# Patient Record
Sex: FEMALE | NOT HISPANIC OR LATINO | URBAN - METROPOLITAN AREA
[De-identification: names, ages, dates, MRNs, and addresses within clinical notes are randomized per-mention and may not be internally consistent; named-entity substitution may affect disease eponyms.]

---

## 2020-04-13 ENCOUNTER — EMERGENCY (EMERGENCY)
Facility: HOSPITAL | Age: 75
LOS: 1 days | Discharge: HOME | End: 2020-04-13
Attending: EMERGENCY MEDICINE
Payer: MEDICARE

## 2020-04-13 VITALS
SYSTOLIC BLOOD PRESSURE: 180 MMHG | RESPIRATION RATE: 20 BRPM | HEART RATE: 106 BPM | TEMPERATURE: 99 F | OXYGEN SATURATION: 88 % | DIASTOLIC BLOOD PRESSURE: 102 MMHG | HEIGHT: 64 IN | WEIGHT: 141.98 LBS

## 2020-04-13 VITALS
OXYGEN SATURATION: 96 % | RESPIRATION RATE: 19 BRPM | TEMPERATURE: 101 F | DIASTOLIC BLOOD PRESSURE: 87 MMHG | HEART RATE: 103 BPM | SYSTOLIC BLOOD PRESSURE: 143 MMHG

## 2020-04-13 LAB
ALBUMIN SERPL ELPH-MCNC: 2.4 G/DL — LOW (ref 3.5–5)
ALP SERPL-CCNC: 78 U/L — SIGNIFICANT CHANGE UP (ref 40–120)
ALT FLD-CCNC: 24 U/L DA — SIGNIFICANT CHANGE UP (ref 10–60)
ANION GAP SERPL CALC-SCNC: 5 MMOL/L — SIGNIFICANT CHANGE UP (ref 5–17)
AST SERPL-CCNC: 32 U/L — SIGNIFICANT CHANGE UP (ref 10–40)
BASOPHILS # BLD AUTO: 0.01 K/UL — SIGNIFICANT CHANGE UP (ref 0–0.2)
BASOPHILS NFR BLD AUTO: 0.2 % — SIGNIFICANT CHANGE UP (ref 0–2)
BILIRUB SERPL-MCNC: 0.5 MG/DL — SIGNIFICANT CHANGE UP (ref 0.2–1.2)
BUN SERPL-MCNC: 9 MG/DL — SIGNIFICANT CHANGE UP (ref 7–18)
CALCIUM SERPL-MCNC: 8.8 MG/DL — SIGNIFICANT CHANGE UP (ref 8.4–10.5)
CHLORIDE SERPL-SCNC: 99 MMOL/L — SIGNIFICANT CHANGE UP (ref 96–108)
CO2 SERPL-SCNC: 30 MMOL/L — SIGNIFICANT CHANGE UP (ref 22–31)
CREAT SERPL-MCNC: 0.72 MG/DL — SIGNIFICANT CHANGE UP (ref 0.5–1.3)
D DIMER BLD IA.RAPID-MCNC: 1271 NG/ML DDU — HIGH
EOSINOPHIL # BLD AUTO: 0.01 K/UL — SIGNIFICANT CHANGE UP (ref 0–0.5)
EOSINOPHIL NFR BLD AUTO: 0.2 % — SIGNIFICANT CHANGE UP (ref 0–6)
FIBRINOGEN PPP-MCNC: 950 MG/DL — HIGH (ref 350–510)
GLUCOSE SERPL-MCNC: 273 MG/DL — HIGH (ref 70–99)
HCT VFR BLD CALC: 39.3 % — SIGNIFICANT CHANGE UP (ref 34.5–45)
HGB BLD-MCNC: 12.8 G/DL — SIGNIFICANT CHANGE UP (ref 11.5–15.5)
IMM GRANULOCYTES NFR BLD AUTO: 0.8 % — SIGNIFICANT CHANGE UP (ref 0–1.5)
LACTATE SERPL-SCNC: 1.6 MMOL/L — SIGNIFICANT CHANGE UP (ref 0.7–2)
LDH SERPL L TO P-CCNC: 439 U/L — HIGH (ref 120–225)
LYMPHOCYTES # BLD AUTO: 1.18 K/UL — SIGNIFICANT CHANGE UP (ref 1–3.3)
LYMPHOCYTES # BLD AUTO: 23.3 % — SIGNIFICANT CHANGE UP (ref 13–44)
MCHC RBC-ENTMCNC: 28.8 PG — SIGNIFICANT CHANGE UP (ref 27–34)
MCHC RBC-ENTMCNC: 32.6 GM/DL — SIGNIFICANT CHANGE UP (ref 32–36)
MCV RBC AUTO: 88.3 FL — SIGNIFICANT CHANGE UP (ref 80–100)
MONOCYTES # BLD AUTO: 0.2 K/UL — SIGNIFICANT CHANGE UP (ref 0–0.9)
MONOCYTES NFR BLD AUTO: 4 % — SIGNIFICANT CHANGE UP (ref 2–14)
NEUTROPHILS # BLD AUTO: 3.62 K/UL — SIGNIFICANT CHANGE UP (ref 1.8–7.4)
NEUTROPHILS NFR BLD AUTO: 71.5 % — SIGNIFICANT CHANGE UP (ref 43–77)
NRBC # BLD: 0 /100 WBCS — SIGNIFICANT CHANGE UP (ref 0–0)
PLATELET # BLD AUTO: 215 K/UL — SIGNIFICANT CHANGE UP (ref 150–400)
POTASSIUM SERPL-MCNC: 4.4 MMOL/L — SIGNIFICANT CHANGE UP (ref 3.5–5.3)
POTASSIUM SERPL-SCNC: 4.4 MMOL/L — SIGNIFICANT CHANGE UP (ref 3.5–5.3)
PROT SERPL-MCNC: 7.7 G/DL — SIGNIFICANT CHANGE UP (ref 6–8.3)
RBC # BLD: 4.45 M/UL — SIGNIFICANT CHANGE UP (ref 3.8–5.2)
RBC # FLD: 11.9 % — SIGNIFICANT CHANGE UP (ref 10.3–14.5)
SODIUM SERPL-SCNC: 134 MMOL/L — LOW (ref 135–145)
WBC # BLD: 5.06 K/UL — SIGNIFICANT CHANGE UP (ref 3.8–10.5)
WBC # FLD AUTO: 5.06 K/UL — SIGNIFICANT CHANGE UP (ref 3.8–10.5)

## 2020-04-13 PROCEDURE — 71045 X-RAY EXAM CHEST 1 VIEW: CPT | Mod: 26

## 2020-04-13 PROCEDURE — 85384 FIBRINOGEN ACTIVITY: CPT

## 2020-04-13 PROCEDURE — 99284 EMERGENCY DEPT VISIT MOD MDM: CPT

## 2020-04-13 PROCEDURE — 36415 COLL VENOUS BLD VENIPUNCTURE: CPT

## 2020-04-13 PROCEDURE — 83605 ASSAY OF LACTIC ACID: CPT

## 2020-04-13 PROCEDURE — 86140 C-REACTIVE PROTEIN: CPT

## 2020-04-13 PROCEDURE — 84145 PROCALCITONIN (PCT): CPT

## 2020-04-13 PROCEDURE — 85027 COMPLETE CBC AUTOMATED: CPT

## 2020-04-13 PROCEDURE — 87635 SARS-COV-2 COVID-19 AMP PRB: CPT

## 2020-04-13 PROCEDURE — 80053 COMPREHEN METABOLIC PANEL: CPT

## 2020-04-13 PROCEDURE — 71045 X-RAY EXAM CHEST 1 VIEW: CPT

## 2020-04-13 PROCEDURE — 99283 EMERGENCY DEPT VISIT LOW MDM: CPT | Mod: 25

## 2020-04-13 PROCEDURE — 82728 ASSAY OF FERRITIN: CPT

## 2020-04-13 PROCEDURE — 83615 LACTATE (LD) (LDH) ENZYME: CPT

## 2020-04-13 PROCEDURE — 82962 GLUCOSE BLOOD TEST: CPT

## 2020-04-13 PROCEDURE — 85379 FIBRIN DEGRADATION QUANT: CPT

## 2020-04-13 RX ORDER — ACETAMINOPHEN 500 MG
650 TABLET ORAL ONCE
Refills: 0 | Status: COMPLETED | OUTPATIENT
Start: 2020-04-13 | End: 2020-04-13

## 2020-04-13 RX ADMIN — Medication 650 MILLIGRAM(S): at 19:03

## 2020-04-13 NOTE — ED PROVIDER NOTE - NSFOLLOWUPINSTRUCTIONS_ED_ALL_ED_FT
English    COVID-19  COVID-19, also known as coronavirus disease or novel coronavirus, is caused by a type of virus that causes respiratory illness. This may lead to inflammation and the buildup of mucus and fluids in the airway of the lungs (pneumonia). There are many different coronaviruses. Most of these viruses only affect animals, but sometimes these viruses can change and infect people.  What are the causes?  This illness is caused by a virus. You may catch the virus by:  Breathing in droplets from an infected person's cough or sneeze.Touching something, like a table or a doorknob, that was exposed to the virus (contaminated) and then touching your mouth, nose, or eyes.Being around animals that carry the virus, or eating uncooked or undercooked meat or animal products that contain the virus.What increases the risk?  You are more likely to develop this condition if you:  Live in or travel to an area with a COVID-19 outbreak.Come in contact with a sick person who recently traveled to an area with a COVID-19 outbreak.Provide care for or live with a person who is infected with COVID-19.What are the signs or symptoms?  COVID-19 causes respiratory illness that can lead to pneumonia. Symptoms of pneumonia may include:  A fever.A cough.Difficulty breathing.How is this diagnosed?  This condition may be diagnosed based on:  Your signs and symptoms, especially if:  You live in an area with a COVID-19 outbreak.You recently traveled to or from an area where the virus is common.You provide care for or live with a person who was diagnosed with COVID-19.A physical exam.Lab tests, which may include:  A nasal swab to take a sample of fluid from your nose.A throat swab to take a sample of fluid from your throat.A sample of mucus from your lungs (sputum).Blood tests.How is this treated?  There is no medicine to treat COVID-19. Your health care provider will talk with you about ways to treat your symptoms. This may include rest, fluids, and over-the-counter medicines.  Follow these instructions at home:  Lifestyle     Use a cool-mist humidifier to add moisture to the air. This can help you breathe more easily.Do not use any products that contain nicotine or tobacco, such as cigarettes, e-cigarettes, and chewing tobacco. If you need help quitting, ask your health care provider.Rest at home as told by your health care provider.Return to your normal activities as told by your health care provider. Ask your health care provider what activities are safe for you.General instructions     Take over-the-counter and prescription medicines only as told by your health care provider.Drink enough fluid to keep your urine pale yellow.Keep all follow-up visits as told by your health care provider. This is important.How is this prevented?     There is no vaccine to help prevent COVID-19 infection. However, there are steps you can take to protect yourself and others from this virus.  To protect yourself:     Do not travel to areas where COVID-19 is a risk. The areas where COVID-19 is reported change often. To identify high-risk areas, check the CDC travel website: wwwnc.cdc.gov/travel/noticesIf you live in, or must travel to, an area where COVID-19 is a risk, take precautions to avoid infection.  Stay away from people who are sick.Stay away from places where there are animals that may carry the virus. This includes places where animals and animal products are sold. Note that both living and dead animals can carry the virus.Wash your hands often with soap and water. If soap and water are not available, use an alcohol-based hand .Avoid touching your mouth, face, eyes, or nose.To protect others:     If you have symptoms, take steps to prevent the virus from spreading to others.  If you think you have a COVID-19 infection, contact your health care provider right away. Tell your health care team that you think you may have a COVID-19 infection.Stay home. Leave your house only to seek medical care.Do not travel while you are sick.Wash your hands often with soap and water. If soap and water are not available, use alcohol-based hand .Stay away from other members of your household. If possible, stay in your own room, separate from others. Use a different bathroom.Make sure that all people in your household wash their hands well and often.Cough or sneeze into a tissue or your sleeve or elbow. Do not cough or sneeze into your hand or into the air.Wear a face mask.Where to find more information  Centers for Disease Control and Prevention: www.cdc.gov/coronavirus/2019-ncov/index.htmlW[facility] Organization: www.who.int/health-topics/coronavirusContact a health care provider if:  You have traveled to an area where COVID-19 is a risk and you have symptoms of the infection.You have contact with someone who has traveled to an area where COVID-19 is a risk and you have symptoms of the infection.Get help right away if:  You have trouble breathing.You have chest pain.Summary  COVID-19 is caused by a type of virus that causes respiratory illness. This may lead to inflammation and the buildup of mucus and fluids in the airway of the lungs (pneumonia).You are more likely to develop this condition if you live in or travel to an area with a COVID-19 outbreak.There is no medicine to treat COVID-19. Your health care provider will talk with you about ways to treat your symptoms.Take steps to protect yourself and others from infection. Wash your hands often. Stay away from other people who are sick and wear a mask if you are sick.This information is not intended to replace advice given to you by your health care provider. Make sure you discuss any questions you have with your health care provider.    Document Released: 01/23/2020 Document Revised: 03/13/2020 Document Reviewed: 01/23/2020  Elsevier Patient Education © 2020 [employer]

## 2020-04-13 NOTE — ED ADULT TRIAGE NOTE - CHIEF COMPLAINT QUOTE
BIBA for Patient seen and examined by MD/ACP. Patient swabbed for flu and discharged by MD/ACP. like symptoms x 2 weeks with hypoxia.

## 2020-04-13 NOTE — ED PROVIDER NOTE - PHYSICAL EXAMINATION
GENERAL: well appearing, no acute distress, occasionally coughing   HEAD: atraumatic   EYES: EOMI, pink conjunctiva   ENT: moist oral mucosa   CARDIAC: RRR, no edema, distal pulses present   RESPIRATORY: lungs CTAB, no increased work of breathing, 2 L NC in place   GASTROINTESTINAL: no abdominal tenderness, no rebound or guarding, bowel sounds presents  GENITOURINARY: no CVA tenderness   MUSCULOSKELETAL: no deformity   NEUROLOGICAL: AAOx3, spontaneous movement of extremities   SKIN: intact   PSYCHIATRIC: cooperative  HEME LYMPH: no lymphadenopathy

## 2020-04-13 NOTE — ED PROVIDER NOTE - OBJECTIVE STATEMENT
75 yo F pmh of HTN and DM presents with 2 weeks of fever and cough. Has had gradually worsening SOB x 1-2 days so called EMS. Denies other acute complaints.

## 2020-04-13 NOTE — ED PROVIDER NOTE - PROGRESS NOTE DETAILS
Labs and CXR consistent with COVID  On reassessment, pt's resting ox sat 95% and with ambulation 93%. Pt wound like to go home. Will give incentive spirometer. Will fu with PCP. Discussed indications for patient return to ED. Patient understood.

## 2020-04-13 NOTE — ED PROVIDER NOTE - PATIENT PORTAL LINK FT
You can access the FollowMyHealth Patient Portal offered by [facility] by registering at the following website: [email or web address]. By joining [facility]’s FollowMyHealth portal, you will also be able to view your health information using other applications (apps) compatible with our system.

## 2020-04-13 NOTE — ED PROVIDER NOTE - CLINICAL SUMMARY MEDICAL DECISION MAKING FREE TEXT BOX
73 yo F with flu symptoms, sat 88% in triage, but 93% on RA during my exam. No increased work of breathing. Symptoms likely consistent with covid. Plan - CXR, labs, oxygen, reassess.

## 2020-04-14 LAB
CRP SERPL-MCNC: 28.36 MG/DL — HIGH (ref 0–0.4)
FERRITIN SERPL-MCNC: 455 NG/ML — HIGH (ref 15–150)
PROCALCITONIN SERPL-MCNC: 0.07 NG/ML — SIGNIFICANT CHANGE UP (ref 0.02–0.1)
SARS-COV-2 RNA SPEC QL NAA+PROBE: DETECTED

## 2023-09-19 NOTE — ED PROVIDER NOTE - NS ED ROS FT
CONSTITUTIONAL: +fever, no chills   EYES: no visual changes, no eye pain   ENMT: no nasal congestion, no throat pain  CARDIOVASCULAR: no chest pain, no edema, no palpitations   RESPIRATORY: +shortness of breath, +cough   GASTROINTESTINAL: no abdominal pain, no nausea, no vomiting, no diarrhea, no constipation   GENITOURINARY: no dysuria, no frequency  MUSCULOSKELETAL: no joint pains, no myalgias, no back pain   SKIN: no rashes  NEUROLOGICAL: no weakness, no headache, no dizziness, no slurred speech, no syncope   PSYCHIATRIC: no known mental health illness   HEME/LYMPH: no lymphadenopathy      All other ROS negative except as per HPI
none